# Patient Record
Sex: MALE | URBAN - METROPOLITAN AREA
[De-identification: names, ages, dates, MRNs, and addresses within clinical notes are randomized per-mention and may not be internally consistent; named-entity substitution may affect disease eponyms.]

---

## 2022-08-06 ENCOUNTER — CLAIMS CREATED FROM THE CLAIM WINDOW (OUTPATIENT)
Dept: URBAN - METROPOLITAN AREA MEDICAL CENTER 28 | Facility: MEDICAL CENTER | Age: 52
End: 2022-08-06

## 2022-08-06 ENCOUNTER — CLAIMS CREATED FROM THE CLAIM WINDOW (OUTPATIENT)
Dept: URBAN - METROPOLITAN AREA MEDICAL CENTER 28 | Facility: MEDICAL CENTER | Age: 52
End: 2022-08-06
Payer: COMMERCIAL

## 2022-08-06 DIAGNOSIS — K94.29 GASTRIC TUBE GRANULATION TISSUE: ICD-10-CM

## 2022-08-06 PROCEDURE — 99253 IP/OBS CNSLTJ NEW/EST LOW 45: CPT | Performed by: INTERNAL MEDICINE

## 2023-02-27 ENCOUNTER — OUT OF OFFICE VISIT (OUTPATIENT)
Dept: URBAN - METROPOLITAN AREA MEDICAL CENTER 16 | Facility: MEDICAL CENTER | Age: 53
End: 2023-02-27
Payer: COMMERCIAL

## 2023-02-27 DIAGNOSIS — Z79.01 ANTICOAGULANT LONG-TERM USE: ICD-10-CM

## 2023-02-27 DIAGNOSIS — K92.1 ACUTE MELENA: ICD-10-CM

## 2023-02-27 DIAGNOSIS — Z12.11 COLON CANCER SCREENING: ICD-10-CM

## 2023-02-27 PROCEDURE — 996 AG2 NON-BILLABLE

## 2023-02-27 PROCEDURE — 99223 1ST HOSP IP/OBS HIGH 75: CPT | Performed by: INTERNAL MEDICINE

## 2023-02-27 PROCEDURE — G8427 DOCREV CUR MEDS BY ELIG CLIN: HCPCS | Performed by: INTERNAL MEDICINE

## 2023-02-27 PROCEDURE — 99232 SBSQ HOSP IP/OBS MODERATE 35: CPT | Performed by: INTERNAL MEDICINE

## 2023-05-15 ENCOUNTER — OFFICE VISIT (OUTPATIENT)
Dept: URBAN - METROPOLITAN AREA CLINIC 118 | Facility: CLINIC | Age: 53
End: 2023-05-15

## 2023-07-21 ENCOUNTER — DASHBOARD ENCOUNTERS (OUTPATIENT)
Age: 53
End: 2023-07-21

## 2023-07-21 ENCOUNTER — OFFICE VISIT (OUTPATIENT)
Dept: URBAN - METROPOLITAN AREA CLINIC 109 | Facility: CLINIC | Age: 53
End: 2023-07-21
Payer: COMMERCIAL

## 2023-07-21 VITALS
WEIGHT: 203.6 LBS | SYSTOLIC BLOOD PRESSURE: 140 MMHG | HEART RATE: 64 BPM | TEMPERATURE: 98.1 F | HEIGHT: 71 IN | BODY MASS INDEX: 28.5 KG/M2 | DIASTOLIC BLOOD PRESSURE: 89 MMHG

## 2023-07-21 DIAGNOSIS — K21.00 GASTROESOPHAGEAL REFLUX DISEASE WITH ESOPHAGITIS WITHOUT HEMORRHAGE: ICD-10-CM

## 2023-07-21 DIAGNOSIS — K64.2 GRADE III HEMORRHOIDS: ICD-10-CM

## 2023-07-21 PROBLEM — 266433003: Status: ACTIVE | Noted: 2023-07-21

## 2023-07-21 PROCEDURE — 99213 OFFICE O/P EST LOW 20 MIN: CPT | Performed by: INTERNAL MEDICINE

## 2023-07-21 PROCEDURE — 46221 LIGATION OF HEMORRHOID(S): CPT | Performed by: INTERNAL MEDICINE

## 2023-07-21 RX ORDER — PANTOPRAZOLE SODIUM 40 MG/1
1 TABLET TABLET, DELAYED RELEASE ORAL ONCE A DAY
Status: ACTIVE | COMMUNITY

## 2023-07-21 RX ORDER — FERROUS SULFATE 325(65) MG
1 TABLET TABLET ORAL
Status: ACTIVE | COMMUNITY

## 2023-07-21 RX ORDER — PROMETHAZINE HYDROCHLORIDE 12.5 MG/1
1 TABLET AS NEEDED TABLET ORAL
Qty: 120 TABLET | Refills: 1 | OUTPATIENT
Start: 2023-07-21 | End: 2023-09-19

## 2023-07-21 RX ORDER — PANTOPRAZOLE SODIUM 40 MG/1
1 TABLET TABLET, DELAYED RELEASE ORAL ONCE A DAY
Qty: 90 | Refills: 3 | OUTPATIENT
Start: 2023-07-21

## 2023-07-21 NOTE — HPI-TODAY'S VISIT:
patient with complicated history NSCC of head and neck, had PEG and chemo and radiation that was 2022 in remission currently had hemorrhoids that were causing problems, had hemorrhoid banding with Dr. Aburto 2/2023, was still on the xarelto and had severe bleeding (required blood transfusion), went [place] [facility] [place] did EGD/colon  hemorrhoids for 20 years   Dr Morelos 3/5/23 EGD/Colon Diagnostic Impression:   1) Mild gastritis 2) R/O Reid's esophagus   Recommendations: -Await pathology results. Treat H Pylori if positive. -Colonoscopy as scheduled Diagnostic Impression:   1) Distal rectal ulcer along dentate line.  Pt s/p recent hemorrhoid band ligation 2) Internal hemorrhoids 3) Diminutive sigmoid colon polyp s/p polypectomy 4) No old/fresh blood or active bleeding noted  Final Diagnosis A. Stomach, antrum and body (biopsy): Mild chronic gastritis. No Helicobacter pylori-like microorganisms are identified by special stain.    B.  Gastroesophageal junction (biopsy): Gastroesophageal junction with features of reflux esophagitis. No intestinal metaplasia is identified. Alcian blue/PAS stain is negative for intestinal metaplasia and fungal elements.   C.  Sigmoid colon (polypectomy): Hyperplastic polyp

## 2023-07-21 NOTE — EXAM-PHYSICAL EXAM
Rebecca in room as chaperone Rectal exam: small non bleeding external hemorrhoids  Anoscopy performed using self lighted anoscope Grade 3 RA, Grade 3 RP, Grade 3 LL  Banded RP, RA, LL using [facility] system without complication after reviewing R/B/A with the patient

## 2023-08-11 ENCOUNTER — OFFICE VISIT (OUTPATIENT)
Dept: URBAN - METROPOLITAN AREA CLINIC 109 | Facility: CLINIC | Age: 53
End: 2023-08-11

## 2023-11-10 ENCOUNTER — OFFICE VISIT (OUTPATIENT)
Dept: URBAN - METROPOLITAN AREA CLINIC 109 | Facility: CLINIC | Age: 53
End: 2023-11-10

## 2024-08-26 ENCOUNTER — LAB OUTSIDE AN ENCOUNTER (OUTPATIENT)
Dept: URBAN - METROPOLITAN AREA CLINIC 88 | Facility: CLINIC | Age: 54
End: 2024-08-26

## 2024-08-26 ENCOUNTER — OFFICE VISIT (OUTPATIENT)
Dept: URBAN - METROPOLITAN AREA CLINIC 88 | Facility: CLINIC | Age: 54
End: 2024-08-26
Payer: COMMERCIAL

## 2024-08-26 VITALS
SYSTOLIC BLOOD PRESSURE: 166 MMHG | DIASTOLIC BLOOD PRESSURE: 112 MMHG | BODY MASS INDEX: 31.22 KG/M2 | HEART RATE: 77 BPM | WEIGHT: 223 LBS | HEIGHT: 71 IN | OXYGEN SATURATION: 100 % | TEMPERATURE: 97.2 F

## 2024-08-26 DIAGNOSIS — R14.0 ABDOMINAL BLOATING: ICD-10-CM

## 2024-08-26 DIAGNOSIS — R13.19 ESOPHAGEAL DYSPHAGIA: ICD-10-CM

## 2024-08-26 DIAGNOSIS — D50.0 IRON DEFICIENCY ANEMIA DUE TO CHRONIC BLOOD LOSS: ICD-10-CM

## 2024-08-26 DIAGNOSIS — K21.00 GASTROESOPHAGEAL REFLUX DISEASE WITH ESOPHAGITIS WITHOUT HEMORRHAGE: ICD-10-CM

## 2024-08-26 PROBLEM — 724556004: Status: ACTIVE | Noted: 2024-08-26

## 2024-08-26 PROCEDURE — 99214 OFFICE O/P EST MOD 30 MIN: CPT | Performed by: NURSE PRACTITIONER

## 2024-08-26 RX ORDER — PANTOPRAZOLE SODIUM 40 MG/1
1 TABLET TABLET, DELAYED RELEASE ORAL ONCE A DAY
OUTPATIENT
Start: 2023-07-21

## 2024-08-26 RX ORDER — PANTOPRAZOLE SODIUM 40 MG/1
1 TABLET TABLET, DELAYED RELEASE ORAL ONCE A DAY
Qty: 90 | Refills: 3 | Status: ACTIVE | COMMUNITY
Start: 2023-07-21

## 2024-08-26 RX ORDER — FERROUS SULFATE 325(65) MG
1 TABLET TABLET ORAL
Status: ACTIVE | COMMUNITY

## 2024-08-26 NOTE — HPI-TODAY'S VISIT:
54 y.o. presents toady, along with his wife, for evaluation of dysphagia and abdominal bloating.  Abdominal fullness and abd bloating has been a daily complaint since onset 2 months ago.  This fullness is present with and without eating.  Reports early satiety and occasional indigestion.  Denies unexplained weight loss. Over the last few weeks, has noticed dysphagia or sensation of food becoming lodged in epigastric region.  Reports one episode of vomiting to resolve this issue.  Denies pain with swallowing, taste changes, loss of appetite. Voices quality changes.   PET Scan in March 2024 revealed hiatal hernia.  Denies prior knowledge of this.  Remains on daily pantoprazole to treat GERD with adequate control of symptoms voiced.  Missing 2 doses can lead to return in symptoms of reflux.  Last EGD was by Dr. Morelos in 2023 with reflux esophagitis and gastritis.    Patient has a PMH of Stage IV neck and throat cancer.  This was treated with chemo, radiation and surgery in June 2022.  States half of his saliva glands were also removed.  Due to this, reports excessive thirst.  Unsure if this is contributing to his c/o dysphagia. ices normal laryngoscopy by ENT a month ago.    Defecation occurs on daily basis and voices a complete sense of evacuation of stool.  Experienced an episode of rectal bleeding in 2023 after having a hemorrhoid banding while on Xarelto.  Was informed to start iron tablet once a day after this episode. Denies labs since this time.   Last colonoscopy was by Dr. Morelos in 2023.

## 2024-08-26 NOTE — PHYSICAL EXAM GASTROINTESTINAL
Abdomen , soft, nontender, protruding, no guarding or rigidity , no masses palpable , normal bowel sounds , Liver and Spleen: no hepatosplenomegaly

## 2024-08-26 NOTE — HPI-OTHER HISTORIES
- - - - - - - - - - - - - - - - - - - - - - - - - - - - - Office note 07/21/2023 by Dr. Devaughn Altamirano: patient with complicated history NSCC of head and neck, had PEG and chemo and radiation that was 2022 in remission currently had hemorrhoids that were causing problems, had hemorrhoid banding with Dr. Aburto 2/2023, was still on the xarelto and had severe bleeding (required blood transfusion), went to [facility] [place] and they did EGD/colon  hemorrhoids for 20 years   Dr Morelos 3/5/23 EGD/Colon Diagnostic Impression: 1) Mild gastritis 2) R/O Reid's esophagus  Recommendations: -Await pathology results. Treat H Pylori if positive. -Colonoscopy as scheduled Diagnostic Impression: 1) Distal rectal ulcer along dentate line. Pt s/p recent hemorrhoid band ligation 2) Internal hemorrhoids 3) Diminutive sigmoid colon polyp s/p polypectomy 4) No old/fresh blood or active bleeding noted  Final Diagnosis A. Stomach, antrum and body (biopsy): Mild chronic gastritis. No Helicobacter pylori-like microorganisms are identified by special stain.  B. Gastroesophageal junction (biopsy): Gastroesophageal junction with features of reflux esophagitis. No intestinal metaplasia is identified. Alcian blue/PAS stain is negative for intestinal metaplasia and fungal elements.  C. Sigmoid colon (polypectomy): Hyperplastic polyp

## 2024-08-28 LAB
ALBUMIN: 4.5
ALKALINE PHOSPHATASE: 71
ALT (SGPT): 29
AST (SGOT): 28
BASO (ABSOLUTE): 0.1
BASOS: 1
BILIRUBIN, TOTAL: 0.3
BUN/CREATININE RATIO: 17
BUN: 22
CALCIUM: 9.5
CARBON DIOXIDE, TOTAL: 25
CHLORIDE: 103
CREATININE: 1.32
EGFR: 63
EOS (ABSOLUTE): 0.1
EOS: 3
FERRITIN, SERUM: 48
GLOBULIN, TOTAL: 2.4
GLUCOSE: 100
HEMATOCRIT: 43
HEMATOLOGY COMMENTS:: (no result)
HEMOGLOBIN: 14.6
IMMATURE CELLS: (no result)
IMMATURE GRANS (ABS): 0
IMMATURE GRANULOCYTES: 0
IRON BIND.CAP.(TIBC): 334
IRON SATURATION: 34
IRON: 114
LYMPHS (ABSOLUTE): 0.7
LYMPHS: 13
MCH: 33.9
MCHC: 34
MCV: 100
MONOCYTES(ABSOLUTE): 0.6
MONOCYTES: 10
NEUTROPHILS (ABSOLUTE): 4.1
NEUTROPHILS: 73
NRBC: (no result)
PLATELETS: 244
POTASSIUM: 4.9
PROTEIN, TOTAL: 6.9
RBC: 4.31
RDW: 12.4
SODIUM: 141
TRANSFERRIN: 257
UIBC: 220
WBC: 5.6

## 2024-09-04 ENCOUNTER — OFFICE VISIT (OUTPATIENT)
Dept: URBAN - METROPOLITAN AREA SURGERY CENTER 24 | Facility: SURGERY CENTER | Age: 54
End: 2024-09-04

## 2024-09-04 ENCOUNTER — TELEPHONE ENCOUNTER (OUTPATIENT)
Dept: URBAN - METROPOLITAN AREA CLINIC 88 | Facility: CLINIC | Age: 54
End: 2024-09-04

## 2024-09-04 ENCOUNTER — WEB ENCOUNTER (OUTPATIENT)
Dept: URBAN - METROPOLITAN AREA CLINIC 88 | Facility: CLINIC | Age: 54
End: 2024-09-04

## 2024-09-04 RX ORDER — PROMETHAZINE HYDROCHLORIDE 12.5 MG/1
1 TABLET TABLET ORAL
Qty: 14 | Refills: 1
Start: 2023-07-21 | End: 2024-09-19

## 2024-09-04 RX ORDER — PANTOPRAZOLE SODIUM 40 MG/1
1 TABLET TABLET, DELAYED RELEASE ORAL ONCE A DAY
Status: ACTIVE | COMMUNITY
Start: 2023-07-21

## 2024-09-04 RX ORDER — FERROUS SULFATE 325(65) MG
1 TABLET TABLET ORAL
Status: ACTIVE | COMMUNITY

## 2024-09-18 ENCOUNTER — WEB ENCOUNTER (OUTPATIENT)
Dept: URBAN - METROPOLITAN AREA CLINIC 88 | Facility: CLINIC | Age: 54
End: 2024-09-18

## 2024-09-18 RX ORDER — PANTOPRAZOLE SODIUM 40 MG/1
1 TABLET TABLET, DELAYED RELEASE ORAL
Qty: 90 TABLET | Refills: 1
Start: 2023-07-21

## 2024-10-29 ENCOUNTER — OFFICE VISIT (OUTPATIENT)
Dept: URBAN - METROPOLITAN AREA SURGERY CENTER 23 | Facility: SURGERY CENTER | Age: 54
End: 2024-10-29

## 2024-10-29 RX ORDER — PANTOPRAZOLE SODIUM 40 MG/1
1 TABLET TABLET, DELAYED RELEASE ORAL
Qty: 90 TABLET | Refills: 1 | Status: ACTIVE | COMMUNITY
Start: 2023-07-21

## 2024-10-29 RX ORDER — FAMOTIDINE 40 MG/1
1 TABLET AT BEDTIME TABLET, FILM COATED ORAL ONCE A DAY
Qty: 90 TABLET | Refills: 3 | OUTPATIENT
Start: 2024-10-29

## 2024-10-29 RX ORDER — FERROUS SULFATE 325(65) MG
1 TABLET TABLET ORAL
Status: ACTIVE | COMMUNITY